# Patient Record
Sex: FEMALE | Race: WHITE | NOT HISPANIC OR LATINO
[De-identification: names, ages, dates, MRNs, and addresses within clinical notes are randomized per-mention and may not be internally consistent; named-entity substitution may affect disease eponyms.]

---

## 2021-07-01 PROBLEM — Z00.00 ENCOUNTER FOR PREVENTIVE HEALTH EXAMINATION: Status: ACTIVE | Noted: 2021-07-01

## 2021-07-18 PROBLEM — I34.0 NONRHEUMATIC MITRAL VALVE REGURGITATION: Status: ACTIVE | Noted: 2021-07-18

## 2021-07-18 NOTE — ASSESSMENT
[FreeTextEntry1] : 81 y/o F with CAD (late presenting RCA MI with inferior wall scar, LAD s/p PCI), chronic systolic heart failure with functional MR who presents for f/u.\par -Her most recent TTE shows that functional MR is improved. Would medically manage now and she should repeat TTE to assess MR severity in several months.\par -CAD- stable, would continue DAPT, medical therapy\par -GDMT for CHF as per Dr. Moreira)\par -f/u in [facility] at 6 months for evaluation of her FMR\par -Continue care with Dr. Pierce.

## 2021-07-18 NOTE — HISTORY OF PRESENT ILLNESS
[FreeTextEntry1] : 79 yo F who presented with late NSTEMI, found to have occluded RCA with non-viable territory, systolic heart failure LVEF 35%, with severe MR. She was started on GMDT and sent home, but then returned with another episode of CP and was found to have severe LAD disease (iFR positive) s/p SABAS. \par Pt now stable, no CP. She reports NYHA II symptoms.\par TTE (7/7/21) at that time showed \par \par IMPRESSION:\par 1. Normal LV size with diffuse LV hypocontractility. LVEF 35-40% by visual\par estimate.\par 2. No regional wall motion abnormalities.\par 3. Anatomically normal mitral valve with moderate MR. Normal left atrial size.\par Normal pulmonary pressure.\par 4. Aortic sclerosis without aortic stenosis. No AI..

## 2021-07-18 NOTE — REASON FOR VISIT
[Structural Heart and Valve Disease] : structural heart and valve disease [Coronary Artery Disease] : coronary artery disease

## 2021-07-18 NOTE — PHYSICAL EXAM

## 2021-07-19 ENCOUNTER — APPOINTMENT (OUTPATIENT)
Dept: HEART AND VASCULAR | Facility: CLINIC | Age: 81
End: 2021-07-19

## 2021-07-19 DIAGNOSIS — I34.0 NONRHEUMATIC MITRAL (VALVE) INSUFFICIENCY: ICD-10-CM

## 2021-10-19 ENCOUNTER — INPATIENT (INPATIENT)
Facility: HOSPITAL | Age: 81
LOS: 0 days | Discharge: HOME | DRG: 247 | End: 2021-10-20
Attending: INTERNAL MEDICINE | Admitting: INTERNAL MEDICINE
Payer: COMMERCIAL

## 2021-10-19 VITALS
SYSTOLIC BLOOD PRESSURE: 171 MMHG | OXYGEN SATURATION: 98 % | DIASTOLIC BLOOD PRESSURE: 77 MMHG | HEART RATE: 76 BPM | TEMPERATURE: 99 F

## 2021-10-19 DIAGNOSIS — Z98.890 OTHER SPECIFIED POSTPROCEDURAL STATES: Chronic | ICD-10-CM

## 2021-10-19 DIAGNOSIS — E78.5 HYPERLIPIDEMIA, UNSPECIFIED: ICD-10-CM

## 2021-10-19 DIAGNOSIS — Z90.89 ACQUIRED ABSENCE OF OTHER ORGANS: Chronic | ICD-10-CM

## 2021-10-19 DIAGNOSIS — I50.22 CHRONIC SYSTOLIC (CONGESTIVE) HEART FAILURE: ICD-10-CM

## 2021-10-19 DIAGNOSIS — I10 ESSENTIAL (PRIMARY) HYPERTENSION: ICD-10-CM

## 2021-10-19 DIAGNOSIS — I21.4 NON-ST ELEVATION (NSTEMI) MYOCARDIAL INFARCTION: ICD-10-CM

## 2021-10-19 LAB
APTT BLD: 29.6 SEC — SIGNIFICANT CHANGE UP (ref 27.5–35.5)
INR BLD: 1.05 — SIGNIFICANT CHANGE UP (ref 0.88–1.16)
PROTHROM AB SERPL-ACNC: 12.6 SEC — SIGNIFICANT CHANGE UP (ref 10.6–13.6)

## 2021-10-19 PROCEDURE — 99152 MOD SED SAME PHYS/QHP 5/>YRS: CPT

## 2021-10-19 PROCEDURE — 93454 CORONARY ARTERY ANGIO S&I: CPT | Mod: 26,XU

## 2021-10-19 PROCEDURE — 92933 PRQ TRLML C ATHRC ST ANGIOP1: CPT | Mod: RC

## 2021-10-19 RX ORDER — ASPIRIN/CALCIUM CARB/MAGNESIUM 324 MG
1 TABLET ORAL
Qty: 0 | Refills: 0 | DISCHARGE

## 2021-10-19 RX ORDER — ATORVASTATIN CALCIUM 80 MG/1
40 TABLET, FILM COATED ORAL AT BEDTIME
Refills: 0 | Status: DISCONTINUED | OUTPATIENT
Start: 2021-10-19 | End: 2021-10-20

## 2021-10-19 RX ORDER — FUROSEMIDE 40 MG
1 TABLET ORAL
Qty: 0 | Refills: 0 | DISCHARGE

## 2021-10-19 RX ORDER — SACUBITRIL AND VALSARTAN 24; 26 MG/1; MG/1
1 TABLET, FILM COATED ORAL
Qty: 0 | Refills: 0 | DISCHARGE

## 2021-10-19 RX ORDER — INFLUENZA VIRUS VACCINE 15; 15; 15; 15 UG/.5ML; UG/.5ML; UG/.5ML; UG/.5ML
0.5 SUSPENSION INTRAMUSCULAR ONCE
Refills: 0 | Status: DISCONTINUED | OUTPATIENT
Start: 2021-10-19 | End: 2021-10-19

## 2021-10-19 RX ORDER — ALENDRONATE SODIUM 70 MG/1
1 TABLET ORAL
Qty: 0 | Refills: 0 | DISCHARGE

## 2021-10-19 RX ORDER — SODIUM CHLORIDE 9 MG/ML
500 INJECTION INTRAMUSCULAR; INTRAVENOUS; SUBCUTANEOUS
Refills: 0 | Status: DISCONTINUED | OUTPATIENT
Start: 2021-10-19 | End: 2021-10-19

## 2021-10-19 RX ORDER — ASPIRIN/CALCIUM CARB/MAGNESIUM 324 MG
81 TABLET ORAL DAILY
Refills: 0 | Status: DISCONTINUED | OUTPATIENT
Start: 2021-10-20 | End: 2021-10-20

## 2021-10-19 RX ORDER — CLOPIDOGREL BISULFATE 75 MG/1
300 TABLET, FILM COATED ORAL ONCE
Refills: 0 | Status: COMPLETED | OUTPATIENT
Start: 2021-10-19 | End: 2021-10-19

## 2021-10-19 RX ORDER — METOPROLOL TARTRATE 50 MG
50 TABLET ORAL DAILY
Refills: 0 | Status: DISCONTINUED | OUTPATIENT
Start: 2021-10-19 | End: 2021-10-20

## 2021-10-19 RX ORDER — CHLORHEXIDINE GLUCONATE 213 G/1000ML
1 SOLUTION TOPICAL ONCE
Refills: 0 | Status: DISCONTINUED | OUTPATIENT
Start: 2021-10-19 | End: 2021-10-20

## 2021-10-19 RX ORDER — SODIUM CHLORIDE 9 MG/ML
500 INJECTION INTRAMUSCULAR; INTRAVENOUS; SUBCUTANEOUS
Refills: 0 | Status: DISCONTINUED | OUTPATIENT
Start: 2021-10-19 | End: 2021-10-20

## 2021-10-19 RX ORDER — CLOPIDOGREL BISULFATE 75 MG/1
75 TABLET, FILM COATED ORAL DAILY
Refills: 0 | Status: DISCONTINUED | OUTPATIENT
Start: 2021-10-20 | End: 2021-10-20

## 2021-10-19 RX ORDER — SACUBITRIL AND VALSARTAN 24; 26 MG/1; MG/1
1 TABLET, FILM COATED ORAL
Refills: 0 | Status: DISCONTINUED | OUTPATIENT
Start: 2021-10-20 | End: 2021-10-20

## 2021-10-19 RX ORDER — INFLUENZA VIRUS VACCINE 15; 15; 15; 15 UG/.5ML; UG/.5ML; UG/.5ML; UG/.5ML
0.7 SUSPENSION INTRAMUSCULAR ONCE
Refills: 0 | Status: DISCONTINUED | OUTPATIENT
Start: 2021-10-19 | End: 2021-10-20

## 2021-10-19 RX ADMIN — ATORVASTATIN CALCIUM 40 MILLIGRAM(S): 80 TABLET, FILM COATED ORAL at 22:26

## 2021-10-19 RX ADMIN — CLOPIDOGREL BISULFATE 300 MILLIGRAM(S): 75 TABLET, FILM COATED ORAL at 15:30

## 2021-10-19 NOTE — H&P ADULT - NSHPSOCIALHISTORY_GEN_ALL_CORE
Former heavy smoker.  No ETOH or Illicit Drug Use Former heavy smoker.  Quit 35- 40 years ago. Used to smoke up to 2 ppd. No ETOH or Illicit Drug Use

## 2021-10-19 NOTE — H&P ADULT - PROBLEM SELECTOR PLAN 1
Patient C/P free and HD stable.  -Precathed/consented for cardiac cath with Dr. Cleaning with plan for Roto/PCI of RCA.  -Patient received Aspirin 81 mg daily and Plavix 75 mg daily this AM prior to transfer. Additional Plavix 300 mg PO x 1 given prior to procedure per Dr. Cleaning.   -Continue Aspirin, Plavix, Lipitor and Toprol XL.  -Follow-up Hemoglobin A1C and Lipid Panel

## 2021-10-19 NOTE — H&P ADULT - PROBLEM SELECTOR PLAN 4
Continue Lipitor  -Follow-up Lipid Panel        FULL CODE  DVT Prophylaxis: On hold given upcoming cardiac cath  Dispo: Pending clinical progression

## 2021-10-19 NOTE — H&P ADULT - NEGATIVE NEUROLOGICAL SYMPTOMS
no paresthesias/no generalized seizures/no focal seizures/no syncope/no tremors/no difficulty walking/no headache

## 2021-10-19 NOTE — H&P ADULT - HISTORY OF PRESENT ILLNESS
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 While seated that morning she developed left upper chest "uncomfortable sensation" which lasted for a few hours. Patient denies SOB, palpitations, dizziness, diaphoresis, N/V, syncope. She has noted a significant increase in her BP x 1 month and the BP recorded during her chest pain episode was 164/113. Patient R/I NSTEMI with peak high sensitivity Troponin 21.5 trending down to 16.1. EKG per interpretation by physician revealed Sinus rhythm at 76 bpm with premature atrial complexes with Nonspecific ST abnormality. Covid negative 10/17. RVP panel negative 10/17.   Echo 10/18/2021 revealed Intact left ventricular systolic function., no regional hypokinesis, mild MR, Calcified, trileaflet, functionally intact aortic valve, Normal right heart size and function.    Nuclear Stress Test 10/18/2021:Abnormal Technetium SPECT images, consistent with localized apical inferior myocardial nontransmural scarring or ischemia, with mild to moderate circumflex territory reversible ischemia with pharmacologic stress. There is globally normal LV systolic function; the caveat is that automated edge tracking is suspect. The calculated resting LVEF = 55%, with moderate basal to mid inferior hypokinesis suggested. In light of patient’s NSTEMI and ischemia of inferior territory on stress test (previously non-viable), known mRCA 90% heavily calcified stenosis patient is now transferred to [facility] for Rotoblator and PCI of RCA.       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 While seated that morning she developed left upper chest "uncomfortable sensation" which lasted for a few hours. Patient denies SOB, palpitations, dizziness, diaphoresis, N/V, syncope, PND, orthopnea, or LE edema. She has noted a significant increase in her BP x 1 month and the BP recorded during her chest pain episode was 164/113. Patient R/I NSTEMI with peak high sensitivity Troponin 21.5 trending down to 16.1. EKG per interpretation by physician (not scanned and no copy sent) revealed Sinus rhythm at 76 bpm with premature atrial complexes with Nonspecific ST abnormality. No Heparin drip initiated during admission. Covid negative 10/17. RVP panel negative 10/17. CXR negative no evidence of  acute cardiopulmonary disease. Patient also reported 10 lb weight loss due to decreased appetite (stool occult negative 10/19/2021).   Echo 10/18/2021 revealed Intact left ventricular systolic function., no regional hypokinesis, mild MR, Calcified, trileaflet, functionally intact aortic valve, Normal right heart size and function.    Nuclear Stress Test 10/18/2021:Abnormal Technetium SPECT images, consistent with localized apical inferior myocardial nontransmural scarring or ischemia, with mild to moderate circumflex territory reversible ischemia with pharmacologic stress. There is globally normal LV systolic function; the caveat is that automated edge tracking is suspect. The calculated resting LVEF = 55%, with moderate basal to mid inferior hypokinesis suggested. In light of patient’s NSTEMI and ischemia of inferior territory on stress test (previously non-viable), known mRCA 90% heavily calcified stenosis patient is now transferred to [facility] for Rotoblator and PCI of RCA.  Upon arrival to [facility] patient is C/P free and HD stable.     Transfer meds: ASA EC 81 mg daily, Lipitor 40 mg PO QHS, Plavix 75 mg PO Daily, Toprol XL 50 mg PO Daily, Tab a Tessie 1 tab PO Once Daily, Entresto 24/26 mg PO BID, Protonix 40 mg PO Daily,Nitrostat 0.4 mg SL PRN Chest Pain, Senna 2 tabs PO QHS,       Cardiac Cath 7/7/2021 at [facility]: LM: normal LAD: 70-80% mid stenosis, iFR=0.86 LCx: 30-40% diffuse disease, small vessel. RCA: not injected  LVEF: 35% inferior hypokinesis +1-2 MR, No AS. Successful iFR guided PCI of 70-80% mid LAD stenosis with a DESx1, excellent angiographic result with TAHIRA 3 flow   Cardiac cath 6/14/21 at [facility]:  LM: Normal, LAD: 30-40% mid stenosis, two lesions noted, D1: Large vessel, 30% prox stenosis, LCx: 30-40% prox stenosis, RCA: Large vessel, 90% mid stenosis (culprit), heavily calcified, LVEF: 35%, Severe inferior hyopkinesis, EDP 15mmHg Viability study in AM and will reassess, if viable myocardium patient will transfer too [facility] for Rota/PCI of RCA

## 2021-10-19 NOTE — H&P ADULT - PROBLEM SELECTOR PLAN 2
Patient currently euvolemic on exam-bibasilar crackles to mid lobes, no JVD, no LE edema. saturating well on RA, able to lay flat without respiratory distress.  -Is and Os  -Daily Weights  -Echo 10/18/2021 revealed recovered EF to normal (previously 35-40% in July  -Resume Entresto 24/26 mg PO BID in AM pending Cr. Continue Toprol XL.  -Core measures  -

## 2021-10-19 NOTE — H&P ADULT - NSICDXFAMILYHX_GEN_ALL_CORE_FT
FAMILY HISTORY:  Father  Still living? Unknown  Family history of coronary artery disease in father, Age at diagnosis: Age Unknown

## 2021-10-19 NOTE — H&P ADULT - NSICDXPASTSURGICALHX_GEN_ALL_CORE_FT
PAST SURGICAL HISTORY:  History of left breast biopsy 2012-left fibroadenoma    History of sinus surgery     History of tonsillectomy

## 2021-10-19 NOTE — PROGRESS NOTE ADULT - SUBJECTIVE AND OBJECTIVE BOX
Preliminary Coronary Angiography Findings and interventions    One vessel Coronary Artery Disease  Syntax score low  Frailty score 5    Coronary Angiogram  LMCA: Normal  LAD: Widely patent stent in prox segment, 30-40% stenosis mid  D1: 30% stenosis in prox  LCx: 30-50% stenosis in prox segment, small vessel  RCA: Very large, dominant vessel with 95-99% stenosis in mid segment, heavily calcified    Left Heart Catheterization  LV Gram: EF 50-55%  LVEDP 7mmHg  No AS, Mild MR    Intervention  - Successful Rotational Atherectomy followed by PCI of 95-99% heavily calcified stenosis in mid RCA with a 3.5x38mm and 4.0x16mm Synergy SABAS. 0% residual stenosis and excellent angiographic result with TAHIRA 3 flow post intervention.      Rt CFA closed with Angioseal        Recommendations  Continue dual anti-platelet therapy with Aspirin 81mg daily and Plavix 75mg daily for at least 1 year after which Aspirin 81mg daily should be continued indefinitely  Optimal medical therapy as tolerated, including appropriate intensity statin, beta blocker and ACE/ARB if indicated  IV hydration post PCI protocol  Risk factor modification and risk reduction strategies with lifestyle changes and preventative cardiology  Follow up with outpatient cardiologist post discharge (Dr Pierce)

## 2021-10-20 ENCOUNTER — TRANSCRIPTION ENCOUNTER (OUTPATIENT)
Age: 81
End: 2021-10-20

## 2021-10-20 VITALS — TEMPERATURE: 97 F

## 2021-10-20 LAB
A1C WITH ESTIMATED AVERAGE GLUCOSE RESULT: 5.1 % — SIGNIFICANT CHANGE UP (ref 4–5.6)
ANION GAP SERPL CALC-SCNC: 9 MMOL/L — SIGNIFICANT CHANGE UP (ref 5–17)
BASOPHILS # BLD AUTO: 0.05 K/UL — SIGNIFICANT CHANGE UP (ref 0–0.2)
BASOPHILS NFR BLD AUTO: 1 % — SIGNIFICANT CHANGE UP (ref 0–2)
BUN SERPL-MCNC: 13 MG/DL — SIGNIFICANT CHANGE UP (ref 7–23)
CALCIUM SERPL-MCNC: 8.8 MG/DL — SIGNIFICANT CHANGE UP (ref 8.4–10.5)
CHLORIDE SERPL-SCNC: 105 MMOL/L — SIGNIFICANT CHANGE UP (ref 96–108)
CHOLEST SERPL-MCNC: 122 MG/DL — SIGNIFICANT CHANGE UP
CO2 SERPL-SCNC: 23 MMOL/L — SIGNIFICANT CHANGE UP (ref 22–31)
COVID-19 SPIKE DOMAIN AB INTERP: POSITIVE
COVID-19 SPIKE DOMAIN ANTIBODY RESULT: >250 U/ML — HIGH
CREAT SERPL-MCNC: 0.7 MG/DL — SIGNIFICANT CHANGE UP (ref 0.5–1.3)
EOSINOPHIL # BLD AUTO: 0.23 K/UL — SIGNIFICANT CHANGE UP (ref 0–0.5)
EOSINOPHIL NFR BLD AUTO: 4.6 % — SIGNIFICANT CHANGE UP (ref 0–6)
ESTIMATED AVERAGE GLUCOSE: 100 MG/DL — SIGNIFICANT CHANGE UP (ref 68–114)
GLUCOSE SERPL-MCNC: 91 MG/DL — SIGNIFICANT CHANGE UP (ref 70–99)
HCT VFR BLD CALC: 37.5 % — SIGNIFICANT CHANGE UP (ref 34.5–45)
HDLC SERPL-MCNC: 46 MG/DL — LOW
HGB BLD-MCNC: 12.4 G/DL — SIGNIFICANT CHANGE UP (ref 11.5–15.5)
IMM GRANULOCYTES NFR BLD AUTO: 0.2 % — SIGNIFICANT CHANGE UP (ref 0–1.5)
LIPID PNL WITH DIRECT LDL SERPL: 64 MG/DL — SIGNIFICANT CHANGE UP
LYMPHOCYTES # BLD AUTO: 0.6 K/UL — LOW (ref 1–3.3)
LYMPHOCYTES # BLD AUTO: 11.9 % — LOW (ref 13–44)
MAGNESIUM SERPL-MCNC: 2 MG/DL — SIGNIFICANT CHANGE UP (ref 1.6–2.6)
MCHC RBC-ENTMCNC: 33.1 GM/DL — SIGNIFICANT CHANGE UP (ref 32–36)
MCHC RBC-ENTMCNC: 33.2 PG — SIGNIFICANT CHANGE UP (ref 27–34)
MCV RBC AUTO: 100.3 FL — HIGH (ref 80–100)
MONOCYTES # BLD AUTO: 0.33 K/UL — SIGNIFICANT CHANGE UP (ref 0–0.9)
MONOCYTES NFR BLD AUTO: 6.5 % — SIGNIFICANT CHANGE UP (ref 2–14)
NEUTROPHILS # BLD AUTO: 3.83 K/UL — SIGNIFICANT CHANGE UP (ref 1.8–7.4)
NEUTROPHILS NFR BLD AUTO: 75.8 % — SIGNIFICANT CHANGE UP (ref 43–77)
NON HDL CHOLESTEROL: 76 MG/DL — SIGNIFICANT CHANGE UP
NRBC # BLD: 0 /100 WBCS — SIGNIFICANT CHANGE UP (ref 0–0)
PLATELET # BLD AUTO: 175 K/UL — SIGNIFICANT CHANGE UP (ref 150–400)
POTASSIUM SERPL-MCNC: 4.2 MMOL/L — SIGNIFICANT CHANGE UP (ref 3.5–5.3)
POTASSIUM SERPL-SCNC: 4.2 MMOL/L — SIGNIFICANT CHANGE UP (ref 3.5–5.3)
RBC # BLD: 3.74 M/UL — LOW (ref 3.8–5.2)
RBC # FLD: 12.5 % — SIGNIFICANT CHANGE UP (ref 10.3–14.5)
SARS-COV-2 IGG+IGM SERPL QL IA: >250 U/ML — HIGH
SARS-COV-2 IGG+IGM SERPL QL IA: POSITIVE
SODIUM SERPL-SCNC: 137 MMOL/L — SIGNIFICANT CHANGE UP (ref 135–145)
TRIGL SERPL-MCNC: 60 MG/DL — SIGNIFICANT CHANGE UP
WBC # BLD: 5.05 K/UL — SIGNIFICANT CHANGE UP (ref 3.8–10.5)
WBC # FLD AUTO: 5.05 K/UL — SIGNIFICANT CHANGE UP (ref 3.8–10.5)

## 2021-10-20 PROCEDURE — 99238 HOSP IP/OBS DSCHRG MGMT 30/<: CPT

## 2021-10-20 RX ORDER — ATORVASTATIN CALCIUM 80 MG/1
1 TABLET, FILM COATED ORAL
Qty: 30 | Refills: 0
Start: 2021-10-20 | End: 2021-11-18

## 2021-10-20 RX ORDER — METOPROLOL TARTRATE 50 MG
1 TABLET ORAL
Qty: 0 | Refills: 0 | DISCHARGE

## 2021-10-20 RX ORDER — CLOPIDOGREL BISULFATE 75 MG/1
1 TABLET, FILM COATED ORAL
Qty: 0 | Refills: 0 | DISCHARGE

## 2021-10-20 RX ORDER — METOPROLOL TARTRATE 50 MG
1 TABLET ORAL
Qty: 30 | Refills: 0
Start: 2021-10-20 | End: 2021-11-18

## 2021-10-20 RX ORDER — ATORVASTATIN CALCIUM 80 MG/1
1 TABLET, FILM COATED ORAL
Qty: 0 | Refills: 0 | DISCHARGE

## 2021-10-20 RX ORDER — CLOPIDOGREL BISULFATE 75 MG/1
1 TABLET, FILM COATED ORAL
Qty: 30 | Refills: 11
Start: 2021-10-20 | End: 2022-10-14

## 2021-10-20 RX ADMIN — Medication 50 MILLIGRAM(S): at 05:23

## 2021-10-20 RX ADMIN — CLOPIDOGREL BISULFATE 75 MILLIGRAM(S): 75 TABLET, FILM COATED ORAL at 09:35

## 2021-10-20 RX ADMIN — SACUBITRIL AND VALSARTAN 1 TABLET(S): 24; 26 TABLET, FILM COATED ORAL at 09:35

## 2021-10-20 RX ADMIN — Medication 81 MILLIGRAM(S): at 09:35

## 2021-10-20 NOTE — DISCHARGE NOTE PROVIDER - NSDCCPCAREPLAN_GEN_ALL_CORE_FT
PRINCIPAL DISCHARGE DIAGNOSIS  Diagnosis: NSTEMI (non-ST elevation myocardial infarction)  Assessment and Plan of Treatment:       SECONDARY DISCHARGE DIAGNOSES  Diagnosis: Chronic systolic congestive heart failure  Assessment and Plan of Treatment:      PRINCIPAL DISCHARGE DIAGNOSIS  Diagnosis: NSTEMI (non-ST elevation myocardial infarction)  Assessment and Plan of Treatment: You were transferred from [facility] where you had chest pain and an abnormal nuclear stress test. You underwent a cardiac catheterization where 2 drug-eluting cardiac stents were placed to blockage in the Right Coronary Artery. You will need to take Asprin and Plavix daily for the cardiac stent to help keep the stent open. DO NOT STOP taking these medications unless directed by your cardiologist as this can be LIFE THREATENING and lead to closing up of the cardiac stent and causing a heart attack! Continue taking your cholesterol medication Lipitor.      SECONDARY DISCHARGE DIAGNOSES  Diagnosis: Chronic systolic congestive heart failure  Assessment and Plan of Treatment: Resume your home medications of Entresto and Metoprolol.

## 2021-10-20 NOTE — DISCHARGE NOTE PROVIDER - NSDCMRMEDTOKEN_GEN_ALL_CORE_FT
alendronate 70 mg oral tablet: 1 tab(s) orally once a week  Aspirin Enteric Coated 81 mg oral delayed release tablet: 1 tab(s) orally once a day  atorvastatin 40 mg oral tablet: 1 tab(s) orally once a day (at bedtime)  Cardiac rehabilitation. 3 times a week for 12 weeks. Dx NSTEMI s/p PCI. Outpatient cardiologist Dr Nabil Pierce [phone]:   Entresto 24 mg-26 mg oral tablet: 1 tab(s) orally 2 times a day  Metoprolol Succinate ER 50 mg oral tablet, extended release: 1 tab(s) orally once a day  Plavix 75 mg oral tablet: 1 tab(s) orally once a day

## 2021-10-20 NOTE — DISCHARGE NOTE PROVIDER - NSDCFUADDAPPT_GEN_ALL_CORE_FT
-It is recommended for you to go to cardiac rehabilitation, which is outpatient physical therapy tailored to improve the heart. You were provided a prescription and should call your insurance company to find facilities that is covered by your insurance. We have provided you with a prescription for cardiac rehab which is medically supervised exercise program for your heart. It has been shown to improve the quantity and quality of life of people with heart disease like yours. You should attend cardiac rehab 3 times per week for 12 weeks. We have provided you with a list of nearby facilities. Please call your insurance carrier to determine which of these facilities are covered under your plan.   ---Please bring this prescription with you to your follow up appointment with your cardiologist who can then further assist you to enroll into a cardiac rehab program. -It is recommended for you to go to cardiac rehabilitation, which is outpatient physical therapy tailored to improve the heart. You were provided a prescription and should call your insurance company to find facilities that is covered by your insurance. We have provided you with a prescription for cardiac rehab which is medically supervised exercise program for your heart. It has been shown to improve the quantity and quality of life of people with heart disease like yours. You should attend cardiac rehab 3 times per week for 12 weeks. We have provided you with a list of nearby facilities. Please call your insurance carrier to determine which of these facilities are covered under your plan.   ---Please bring this prescription with you to your follow up appointment with your cardiologist who can then further assist you to enroll into a cardiac rehab program.

## 2021-10-20 NOTE — DISCHARGE NOTE PROVIDER - HOSPITAL COURSE
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, Hyperlipidemia, Moderate MR by echo 7/2021 now improved to mild MR by echo 10/18/2021, GERD, CAD s/p NSTEMI 6/14/2021 (90% RCA lesion found to be non-viable myocardium at the time) with subsequent NSTEMI 7/5/2021 (peak Troponin T high sensitivity 39.7)  treated with IFR guided SABAS mLAD (70-80% lesion) 7/7/2021, Ischemic Cardiomyopathy/Chronic Systolic CHF (EF 40% by echo 7/2021 now recovered to normal EF by echo 10/18/2021), Osteoporosis, Sacroilitis, who presented to [facility] 10/17/2021 c/o chest discomfort that began that morning R/I NSTEMI now transferred for Roto/PCI of known RCA lesion as stress test showed inferior ischemia (previously non-viable on cardiac viability).  s/p cardiac catheterization (10/19/2021): patent LAD, LCx minor disease, mRCA 95%, 80% pRCA. Intervention: Rota/SABAS pRCA and Rota/SABAS mRCA. EDP 7 mmHG. EF 50%. Access:  Angioseal (CFA), vascade (CFV—s/p TVP).   -s/p  bolus in room; NS @ 75 cc/hour x 12 hours.    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 10/17/2021 c/o chest discomfort that began in AM and was ruled in for NSTEMI w/ peak high sensitivity Trop 21.5. Pt was transferred to [facility] cardiac tele for Roto/PCI of known RCA lesion as stress test showed inferior ischemia (previously non-viable on cardiac viability). Pt underwent cardiac catheterization (10/19/2021): s/p Rota/SABAS pRCA 80% and Rota/SABAS mRCA 95%; patent LAD, LCx minor disease. EDP 7 mmHG. EF 50%. Access site stable w/o hematoma: Angioseal (CFA), vascade (CFV—s/p TVP). Pt was hydrated w/  bolus in procedure room and NS @ 75 cc/hour x 12 hours post procedure.     Pt was seen and examined at bedside on day of discharge. Denies any complaints of chest pain, dizziness, SOB, palpitations, pain, LE edema, fever and/or chills. Right groin access site soft, no bleeding or swelling at site, radial pulse 2+, 2+ DP/PT pulses. No events on tele overnight, VSS, Labs unremarkable/stable, Physical exam WNL. Pt was seen and examined by cardiology attending as well and is deemed stable for discharge per Dr. Boyd.   Pt is to continue home ASA/Plavix, Atorvastatin 40mg qd. Pt is to follow up with outpatient cardiologist Dr. Pierce in 1-2 weeks for post discharge check-up. Pt instructed to return to ED/seek immediate medical attention if symptoms of chest pain, SOB, LOC, bleeding from the access site. Pt agrees with the discharge plan, verbalizes understanding of the information given.  Referred for Cardiac Rehab (NSTEMI Post PCI): Education on benefits of Cardiac Rehab provided to patient. Referral and Prescription Given for Cardiac Rehab. Pt given list of locations & instructed to contact their insurance company to review list of participating providers. Pt instructed to bring Cardiac Rehab prescription with them to Cardiology Follow up appointment for assistance with enrollment.

## 2021-10-20 NOTE — DISCHARGE NOTE NURSING/CASE MANAGEMENT/SOCIAL WORK - PATIENT PORTAL LINK FT
You can access the FollowMyHealth Patient Portal offered by [facility] by registering at the following website: [email or web address]. By joining [employer]’s FollowMyHealth portal, you will also be able to view your health information using other applications (apps) compatible with our system.

## 2021-10-20 NOTE — DISCHARGE NOTE PROVIDER - CARE PROVIDER_API CALL
Nabil Pierce)  Cardiology [facility]  [address] [place] [address]  Phone: [phone]  Fax: [phone]  Established Patient  Follow Up Time: 2 weeks

## 2021-10-20 NOTE — DISCHARGE NOTE PROVIDER - NSDCFUADDINST_GEN_ALL_CORE_FT
- Do NOT drive or operate hazardous machinery for 24 hours. Limit your physical activity for 24-48 hours. Do NOT engage in sports, heavy work or heavy lifting more than 5 lbs for 5 days.   - You MAY shower and wash the area gently with soap and water. BUT no TUB BATHS, HOT TUBS OR SWIMMING FOR 5 DAYS.  Do not keep the area covered. Do not put any lotions, creams, or ointments on the site.  - Your procedure was done through your right groin site. If you observe flank bleeding from the puncture site, it is an emergency. Please put direct pressure on the site and go directly to the ER. Bleeding under the skin may also occur and a small "black and blue" may be expected. If the area appears to be expanding or swelling around the puncture site, apply manual compression and go immediately to the nearest ER. If your leg/foot becomes cool or blue and/or you are unable to move it, this must be treated as an emergency, go directly to the nearest ER. Look for signs of infection in the groin: fever, red streaking of the arm, obvious pus formation and pain.  -If you have any issues or concerns regarding your access site, you may call [facility] Interventional Cardiology at [phone].

## 2021-10-20 NOTE — DISCHARGE NOTE PROVIDER - NSDCHC_MEDRECSTATUS_GEN_ALL_CORE
Admission Reconciliation is Completed  Discharge Reconciliation is Completed Alert and oriented to person, place and time

## 2021-10-26 DIAGNOSIS — I21.9 ACUTE MYOCARDIAL INFARCTION, UNSPECIFIED: ICD-10-CM

## 2021-10-26 DIAGNOSIS — I34.0 NONRHEUMATIC MITRAL (VALVE) INSUFFICIENCY: ICD-10-CM

## 2021-10-26 DIAGNOSIS — M41.9 SCOLIOSIS, UNSPECIFIED: ICD-10-CM

## 2021-10-26 DIAGNOSIS — E78.5 HYPERLIPIDEMIA, UNSPECIFIED: ICD-10-CM

## 2021-10-26 DIAGNOSIS — I11.0 HYPERTENSIVE HEART DISEASE WITH HEART FAILURE: ICD-10-CM

## 2021-10-26 DIAGNOSIS — M81.0 AGE-RELATED OSTEOPOROSIS WITHOUT CURRENT PATHOLOGICAL FRACTURE: ICD-10-CM

## 2021-10-26 DIAGNOSIS — I25.10 ATHEROSCLEROTIC HEART DISEASE OF NATIVE CORONARY ARTERY WITHOUT ANGINA PECTORIS: ICD-10-CM

## 2021-10-26 DIAGNOSIS — K21.9 GASTRO-ESOPHAGEAL REFLUX DISEASE WITHOUT ESOPHAGITIS: ICD-10-CM

## 2021-10-26 DIAGNOSIS — Z79.02 LONG TERM (CURRENT) USE OF ANTITHROMBOTICS/ANTIPLATELETS: ICD-10-CM

## 2021-10-26 DIAGNOSIS — I50.22 CHRONIC SYSTOLIC (CONGESTIVE) HEART FAILURE: ICD-10-CM

## 2021-10-26 DIAGNOSIS — I42.9 CARDIOMYOPATHY, UNSPECIFIED: ICD-10-CM

## 2021-10-26 PROCEDURE — 85025 COMPLETE CBC W/AUTO DIFF WBC: CPT

## 2021-10-26 PROCEDURE — C1760: CPT

## 2021-10-26 PROCEDURE — C1894: CPT

## 2021-10-26 PROCEDURE — C1724: CPT

## 2021-10-26 PROCEDURE — C1725: CPT

## 2021-10-26 PROCEDURE — 85610 PROTHROMBIN TIME: CPT

## 2021-10-26 PROCEDURE — 86769 SARS-COV-2 COVID-19 ANTIBODY: CPT

## 2021-10-26 PROCEDURE — 80061 LIPID PANEL: CPT

## 2021-10-26 PROCEDURE — C9603: CPT

## 2021-10-26 PROCEDURE — C9602: CPT

## 2021-10-26 PROCEDURE — 83735 ASSAY OF MAGNESIUM: CPT

## 2021-10-26 PROCEDURE — 36415 COLL VENOUS BLD VENIPUNCTURE: CPT

## 2021-10-26 PROCEDURE — C1874: CPT

## 2021-10-26 PROCEDURE — C1887: CPT

## 2021-10-26 PROCEDURE — C1769: CPT

## 2021-10-26 PROCEDURE — 93460 R&L HRT ART/VENTRICLE ANGIO: CPT

## 2021-10-26 PROCEDURE — 80048 BASIC METABOLIC PNL TOTAL CA: CPT

## 2021-10-26 PROCEDURE — 83036 HEMOGLOBIN GLYCOSYLATED A1C: CPT

## 2021-10-26 PROCEDURE — C1889: CPT

## 2021-10-26 PROCEDURE — 85730 THROMBOPLASTIN TIME PARTIAL: CPT

## 2021-10-26 PROCEDURE — 92953 TEMPORARY EXTERNAL PACING: CPT

## 2022-07-01 ENCOUNTER — APPOINTMENT (OUTPATIENT)
Dept: VASCULAR SURGERY | Facility: CLINIC | Age: 82
End: 2022-07-01

## 2022-07-27 ENCOUNTER — APPOINTMENT (OUTPATIENT)
Dept: VASCULAR SURGERY | Facility: CLINIC | Age: 82
End: 2022-07-27

## 2022-07-27 PROCEDURE — 99203 OFFICE O/P NEW LOW 30 MIN: CPT

## 2022-09-02 ENCOUNTER — APPOINTMENT (OUTPATIENT)
Dept: VASCULAR SURGERY | Facility: CLINIC | Age: 82
End: 2022-09-02

## 2022-09-02 PROCEDURE — 99214 OFFICE O/P EST MOD 30 MIN: CPT

## 2022-10-12 NOTE — H&P ADULT - ASSESSMENT
12-Oct-2022 20:38
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, Hyperlipidemia, Moderate MR by echo 7/2021 now improved to mild MR by echo 10/18/2021, GERD, CAD s/p NSTEMI 6/14/2021 (90% RCA lesion found to be non-viable myocardium at the time) with subsequent NSTEMI 7/5/2021 (peak Troponin T high sensitivity 39.7)  treated with IFR guided SABAS mLAD (70-80% lesion) 7/7/2021, Ischemic Cardiomyopathy/Chronic Systolic CHF (EF 40% by echo 7/2021 now recovered to normal EF by echo 10/18/2021), Osteoporosis, Sacroilitis, who presented to [facility] 10/17/2021 c/o chest discomfort that began that morning R/I NSTEMI now transferred for Roto/PCI of known RCA lesion as stress test showed inferior ischemia (previously non-viable on cardiac viability).    ASA III                  Mallampati III  Risks & benefits of procedure and alternative therapy have been explained to the patient including but not limited to: allergic reaction, bleeding w/possible need for blood transfusion, infection, renal and vascular compromise, limb damage, arrhythmia, stroke, vessel dissection/perforation, Myocardial infarction, emergent CABG. Informed consent obtained and in chart.

## 2022-11-19 ENCOUNTER — TRANSCRIPTION ENCOUNTER (OUTPATIENT)
Age: 82
End: 2022-11-19

## 2022-12-20 ENCOUNTER — TRANSCRIPTION ENCOUNTER (OUTPATIENT)
Age: 82
End: 2022-12-20

## 2022-12-21 ENCOUNTER — TRANSCRIPTION ENCOUNTER (OUTPATIENT)
Age: 82
End: 2022-12-21

## 2022-12-23 ENCOUNTER — TRANSCRIPTION ENCOUNTER (OUTPATIENT)
Age: 82
End: 2022-12-23

## 2022-12-29 ENCOUNTER — TRANSCRIPTION ENCOUNTER (OUTPATIENT)
Age: 82
End: 2022-12-29

## 2023-01-05 ENCOUNTER — TRANSCRIPTION ENCOUNTER (OUTPATIENT)
Age: 83
End: 2023-01-05

## 2023-01-12 ENCOUNTER — TRANSCRIPTION ENCOUNTER (OUTPATIENT)
Age: 83
End: 2023-01-12

## 2023-03-15 ENCOUNTER — APPOINTMENT (OUTPATIENT)
Dept: VASCULAR SURGERY | Facility: CLINIC | Age: 83
End: 2023-03-15

## 2023-06-16 ENCOUNTER — APPOINTMENT (OUTPATIENT)
Dept: VASCULAR SURGERY | Facility: CLINIC | Age: 83
End: 2023-06-16
Payer: MEDICARE

## 2023-06-16 PROCEDURE — 99213 OFFICE O/P EST LOW 20 MIN: CPT

## 2023-09-13 ENCOUNTER — TRANSCRIPTION ENCOUNTER (OUTPATIENT)
Age: 83
End: 2023-09-13

## 2023-09-14 ENCOUNTER — TRANSCRIPTION ENCOUNTER (OUTPATIENT)
Age: 83
End: 2023-09-14

## 2023-09-19 ENCOUNTER — TRANSCRIPTION ENCOUNTER (OUTPATIENT)
Age: 83
End: 2023-09-19

## 2023-10-03 ENCOUNTER — TRANSCRIPTION ENCOUNTER (OUTPATIENT)
Age: 83
End: 2023-10-03

## 2023-10-10 ENCOUNTER — TRANSCRIPTION ENCOUNTER (OUTPATIENT)
Age: 83
End: 2023-10-10

## 2023-10-25 ENCOUNTER — TRANSCRIPTION ENCOUNTER (OUTPATIENT)
Age: 83
End: 2023-10-25

## 2023-11-14 ENCOUNTER — TRANSCRIPTION ENCOUNTER (OUTPATIENT)
Age: 83
End: 2023-11-14

## 2024-07-15 ENCOUNTER — RESULT REVIEW (OUTPATIENT)
Age: 84
End: 2024-07-15

## 2024-07-15 ENCOUNTER — TRANSCRIPTION ENCOUNTER (OUTPATIENT)
Age: 84
End: 2024-07-15

## 2025-01-11 ENCOUNTER — RESULT REVIEW (OUTPATIENT)
Age: 85
End: 2025-01-11

## 2025-01-11 ENCOUNTER — TRANSCRIPTION ENCOUNTER (OUTPATIENT)
Age: 85
End: 2025-01-11

## 2025-04-21 ENCOUNTER — RESULT REVIEW (OUTPATIENT)
Age: 85
End: 2025-04-21

## 2025-04-22 ENCOUNTER — TRANSCRIPTION ENCOUNTER (OUTPATIENT)
Age: 85
End: 2025-04-22

## 2025-04-23 ENCOUNTER — TRANSCRIPTION ENCOUNTER (OUTPATIENT)
Age: 85
End: 2025-04-23